# Patient Record
Sex: MALE | Race: WHITE | NOT HISPANIC OR LATINO | Employment: OTHER | URBAN - METROPOLITAN AREA
[De-identification: names, ages, dates, MRNs, and addresses within clinical notes are randomized per-mention and may not be internally consistent; named-entity substitution may affect disease eponyms.]

---

## 2021-02-23 ENCOUNTER — TELEPHONE (OUTPATIENT)
Dept: INTERNAL MEDICINE | Facility: CLINIC | Age: 61
End: 2021-02-23

## 2021-02-23 NOTE — TELEPHONE ENCOUNTER
PRACTICE MANAGER        Caller: Brandyn Sims    Relationship to patient: Self    Best call back number:[phone]     Chief complaint: PHYSICAL     Type of visit: NEW PATIENT, PHYSICAL    Requested date:NEXT AVAILABLE       Additional notes:    MR. SIMS WAS LAST SEEN IN 2016 BY DR. CARLSON, HE IS WANTING TO RE ESTABLISH WITH DR. CARLSON. I INFORMED HIM THAT HE DID NOT HAVE ANY NEW PATIENT APPOINTMENTS AVAILABLE, OFFERED A DIFFERENT PROVIDER. HE WOULD RATHER SEE IF DR. CARLSON WILL CONTINUE SEEING HIM.    PLEASE ADVISE

## 2021-03-23 ENCOUNTER — OFFICE VISIT (OUTPATIENT)
Dept: INTERNAL MEDICINE | Facility: CLINIC | Age: 61
End: 2021-03-23

## 2021-03-23 VITALS
OXYGEN SATURATION: 98 % | HEIGHT: 74 IN | HEART RATE: 70 BPM | WEIGHT: 211.2 LBS | SYSTOLIC BLOOD PRESSURE: 104 MMHG | DIASTOLIC BLOOD PRESSURE: 68 MMHG | BODY MASS INDEX: 27.11 KG/M2

## 2021-03-23 DIAGNOSIS — Z00.00 ROUTINE PHYSICAL EXAMINATION: ICD-10-CM

## 2021-03-23 DIAGNOSIS — E78.2 MIXED HYPERLIPIDEMIA: Chronic | ICD-10-CM

## 2021-03-23 DIAGNOSIS — E55.9 VITAMIN D DEFICIENCY: Chronic | ICD-10-CM

## 2021-03-23 DIAGNOSIS — Z11.59 NEED FOR HEPATITIS C SCREENING TEST: ICD-10-CM

## 2021-03-23 DIAGNOSIS — Z12.11 COLON CANCER SCREENING: ICD-10-CM

## 2021-03-23 DIAGNOSIS — R73.01 IMPAIRED FASTING GLUCOSE: Primary | ICD-10-CM

## 2021-03-23 DIAGNOSIS — R39.15 URINARY URGENCY: ICD-10-CM

## 2021-03-23 DIAGNOSIS — Z51.81 THERAPEUTIC DRUG MONITORING: ICD-10-CM

## 2021-03-23 DIAGNOSIS — N52.9 MALE ERECTILE DISORDER: Chronic | ICD-10-CM

## 2021-03-23 PROCEDURE — 99204 OFFICE O/P NEW MOD 45 MIN: CPT | Performed by: INTERNAL MEDICINE

## 2021-03-23 NOTE — PROGRESS NOTES
03/23/2021    Patient Information  Brandyn Sims                                                                                          [address] [facility] [address]      1960  [unfilled]  There is no work phone number on file.    Chief Complaint:     Follow-up medical problems listed in history of present illness.  Patient has not been seen in 5 years or so and is here to get reestablished.  Complaining of urinary problems.    History of Present Illness:    Patient with a history of impaired fasting glucose, hyperlipidemia, vitamin D deficiency, male erectile dysfunction.  He presents after a prolonged absence of several years.  He was wanting to have his physical but unfortunately blood work was not done.  He is behind on multiple healthcare parameters which we will hopefully take care of over the next several months.  His past medical history reviewed and updated were necessary in the electronic medical record.    The history regarding urinary urgency:    March 23, 2021--patient presents with approximately 1 year history of urinary urgency that is not associated with weak stream or other BPH symptoms although he can get up a couple of times per night.  No burning with urination and no blood noted in the urine.  It has been probably about 5 years since patient has had a PSA.  We are going to schedule him for lab work and annual physical here in the near future and we can readdress this issue at that time.    Review of Systems   Constitutional: Negative.   HENT: Negative.    Eyes: Negative.    Cardiovascular: Negative.    Respiratory: Negative.    Endocrine: Negative.    Hematologic/Lymphatic: Negative.    Skin: Negative.    Musculoskeletal: Negative.    Gastrointestinal: Negative.    Genitourinary: Negative.    Neurological: Negative.    Psychiatric/Behavioral: Negative.    Allergic/Immunologic: Negative.        Active Problems:    Patient Active Problem List   Diagnosis   •  "Hyperlipidemia   • Impaired fasting glucose   • Vitamin D deficiency   • Routine physical examination   • Therapeutic drug monitoring   • Male erectile disorder   • Urinary urgency         Past Medical History:   Diagnosis Date   • Hyperlipidemia 3/1/2016   • Impaired fasting glucose 3/1/2016   • Male erectile disorder 3/4/2016    2016--patient seen for a routine annual and apparently is having some problems with erectile dysfunction.  He would like to have a prescription for Cialis.  Cialis 5 mg #30.   • Vitamin D deficiency 3/1/2016         Past Surgical History:   Procedure Laterality Date   • NO PAST SURGERIES           No Known Allergies      No current outpatient medications on file.      Family History   Problem Relation Age of Onset   • Cancer Mother         Breast Cancer   • Diabetes Father         Type 2   • Heart attack Maternal Grandfather          of an Acute Myocardial Infarction.         Social History     Socioeconomic History   • Marital status:      Spouse name: Not on file   • Number of children: Not on file   • Years of education: Not on file   • Highest education level: Not on file   Tobacco Use   • Smoking status: Never Smoker   • Smokeless tobacco: Never Used   Vaping Use   • Vaping Use: Never used   Substance and Sexual Activity   • Alcohol use: Yes     Comment: Very Moderately   • Drug use: No   • Sexual activity: Yes     Partners: Female         Vitals:    21 1429   BP: 104/68   BP Location: Left arm   Patient Position: Sitting   Cuff Size: Adult   Pulse: 70   SpO2: 98%   Weight: 95.8 kg (211 lb 3.2 oz)   Height: 188 cm (74\")        Body mass index is 27.12 kg/m².      Physical Exam:    General: Alert and oriented x 3.  No acute distress.  Normal affect.  HEENT: Pupils equal, round, reactive to light; extraocular movements intact; sclerae nonicteric; pharynx, ear canals and TMs normal.  Neck: Without JVD, thyromegaly, bruit, or adenopathy.  Lungs: Clear to " auscultation in all fields.  Heart: Regular rate and rhythm without murmur, rub, gallop, or click.  Abdomen: Soft, nontender, without hepatosplenomegaly or hernia.  Bowel sounds normal.  : Deferred.  Rectal: Deferred.  Extremities: Without clubbing, cyanosis, edema, or pulse deficit.  Neurologic: Intact without focal deficit.  Normal station and gait observed during ingress and egress from the examination room.  Skin: Without significant lesion.  Musculoskeletal: Unremarkable.    Lab/other results:      Assessment/Plan:     Diagnosis Plan   1. Impaired fasting glucose  Hemoglobin A1c   2. Hyperlipidemia  NMR LipoProfile   3. Vitamin D deficiency  Vitamin D 25 Hydroxy   4. Male erectile disorder     5. Urinary urgency     6. Therapeutic drug monitoring  CBC (No Diff)    Comprehensive Metabolic Panel    Hemoglobin A1c    NMR LipoProfile    Vitamin D 25 Hydroxy    Urinalysis With Microscopic If Indicated (No Culture) - Urine, Clean Catch    TSH    T4, Free    T3, Free    PSA DIAGNOSTIC    Hepatitis C Antibody   7. Need for hepatitis C screening test  Hepatitis C Antibody   8. Routine physical examination  CBC (No Diff)    Comprehensive Metabolic Panel    Hemoglobin A1c    NMR LipoProfile    Vitamin D 25 Hydroxy    Urinalysis With Microscopic If Indicated (No Culture) - Urine, Clean Catch    TSH    T4, Free    T3, Free    PSA DIAGNOSTIC    Hepatitis C Antibody     New patient to get reestablished who is behind on several health care maintenance issues that we will correct here over the next several months or so.  Recently retired and has more time to take care of himself.  His impaired fasting glucose is a little concerning given the fact that he is having urinary frequency and urgency although not significant BPH symptoms.  He is a little more thirsty than usual we need to make sure he has not developed overt diabetes.  His hyperlipidemia has been fairly significant but not treated and this needs to be reassessed.   Patient is currently in the process of receiving his COVID-19 vaccines and therefore I will not administer any other vaccines that he needs at this time.  We can always do that later.  He does need a screening colonoscopy which I can go ahead and place that order now.    Plan is as follows: Colonoscopy scheduled.  Set up fasting lab and annual physical in the near future.      Procedures

## 2021-03-24 ENCOUNTER — LAB (OUTPATIENT)
Dept: LAB | Facility: HOSPITAL | Age: 61
End: 2021-03-24

## 2021-03-24 DIAGNOSIS — Z00.00 ROUTINE PHYSICAL EXAMINATION: ICD-10-CM

## 2021-03-24 DIAGNOSIS — Z51.81 THERAPEUTIC DRUG MONITORING: ICD-10-CM

## 2021-03-24 DIAGNOSIS — R73.01 IMPAIRED FASTING GLUCOSE: ICD-10-CM

## 2021-03-24 DIAGNOSIS — E78.2 MIXED HYPERLIPIDEMIA: Chronic | ICD-10-CM

## 2021-03-24 DIAGNOSIS — E55.9 VITAMIN D DEFICIENCY: Chronic | ICD-10-CM

## 2021-03-24 DIAGNOSIS — Z11.59 NEED FOR HEPATITIS C SCREENING TEST: ICD-10-CM

## 2021-03-24 LAB
25(OH)D3 SERPL-MCNC: 26.1 NG/ML (ref 30–100)
ALBUMIN SERPL-MCNC: 4.6 G/DL (ref 3.5–5.2)
ALBUMIN/GLOB SERPL: 1.6 G/DL
ALP SERPL-CCNC: 92 U/L (ref 39–117)
ALT SERPL W P-5'-P-CCNC: 22 U/L (ref 1–41)
ANION GAP SERPL CALCULATED.3IONS-SCNC: 12.7 MMOL/L (ref 5–15)
AST SERPL-CCNC: 19 U/L (ref 1–40)
BILIRUB SERPL-MCNC: 1.3 MG/DL (ref 0–1.2)
BILIRUB UR QL STRIP: NEGATIVE
BUN SERPL-MCNC: 17 MG/DL (ref 8–23)
BUN/CREAT SERPL: 16 (ref 7–25)
CALCIUM SPEC-SCNC: 9.4 MG/DL (ref 8.6–10.5)
CHLORIDE SERPL-SCNC: 99 MMOL/L (ref 98–107)
CLARITY UR: CLEAR
CO2 SERPL-SCNC: 26.3 MMOL/L (ref 22–29)
COLOR UR: YELLOW
CREAT SERPL-MCNC: 1.06 MG/DL (ref 0.76–1.27)
DEPRECATED RDW RBC AUTO: 43.4 FL (ref 37–54)
ERYTHROCYTE [DISTWIDTH] IN BLOOD BY AUTOMATED COUNT: 13.1 % (ref 12.3–15.4)
GFR SERPL CREATININE-BSD FRML MDRD: 71 ML/MIN/1.73
GLOBULIN UR ELPH-MCNC: 2.8 GM/DL
GLUCOSE SERPL-MCNC: 310 MG/DL (ref 65–99)
GLUCOSE UR STRIP-MCNC: ABNORMAL MG/DL
HBA1C MFR BLD: 13.23 % (ref 4.8–5.6)
HCT VFR BLD AUTO: 48.8 % (ref 37.5–51)
HCV AB SER DONR QL: NORMAL
HGB BLD-MCNC: 16.2 G/DL (ref 13–17.7)
HGB UR QL STRIP.AUTO: NEGATIVE
KETONES UR QL STRIP: ABNORMAL
LEUKOCYTE ESTERASE UR QL STRIP.AUTO: NEGATIVE
MCH RBC QN AUTO: 30.3 PG (ref 26.6–33)
MCHC RBC AUTO-ENTMCNC: 33.2 G/DL (ref 31.5–35.7)
MCV RBC AUTO: 91.4 FL (ref 79–97)
NITRITE UR QL STRIP: NEGATIVE
PH UR STRIP.AUTO: 5.5 [PH] (ref 5–8)
PLATELET # BLD AUTO: 127 10*3/MM3 (ref 140–450)
PMV BLD AUTO: 13.4 FL (ref 6–12)
POTASSIUM SERPL-SCNC: 4.2 MMOL/L (ref 3.5–5.2)
PROT SERPL-MCNC: 7.4 G/DL (ref 6–8.5)
PROT UR QL STRIP: ABNORMAL
PSA SERPL-MCNC: 2.48 NG/ML (ref 0–4)
RBC # BLD AUTO: 5.34 10*6/MM3 (ref 4.14–5.8)
SODIUM SERPL-SCNC: 138 MMOL/L (ref 136–145)
SP GR UR STRIP: >=1.03 (ref 1–1.03)
T3FREE SERPL-MCNC: 2.71 PG/ML (ref 2–4.4)
T4 FREE SERPL-MCNC: 1.19 NG/DL (ref 0.93–1.7)
TSH SERPL DL<=0.05 MIU/L-ACNC: 2.17 UIU/ML (ref 0.27–4.2)
UROBILINOGEN UR QL STRIP: ABNORMAL
WBC # BLD AUTO: 6.13 10*3/MM3 (ref 3.4–10.8)

## 2021-03-24 PROCEDURE — 80061 LIPID PANEL: CPT

## 2021-03-24 PROCEDURE — 86803 HEPATITIS C AB TEST: CPT

## 2021-03-24 PROCEDURE — 83036 HEMOGLOBIN GLYCOSYLATED A1C: CPT

## 2021-03-24 PROCEDURE — 83704 LIPOPROTEIN BLD QUAN PART: CPT

## 2021-03-24 PROCEDURE — 36415 COLL VENOUS BLD VENIPUNCTURE: CPT

## 2021-03-24 PROCEDURE — 80053 COMPREHEN METABOLIC PANEL: CPT

## 2021-03-24 PROCEDURE — 81003 URINALYSIS AUTO W/O SCOPE: CPT

## 2021-03-24 PROCEDURE — 85027 COMPLETE CBC AUTOMATED: CPT

## 2021-03-24 PROCEDURE — 84443 ASSAY THYROID STIM HORMONE: CPT

## 2021-03-24 PROCEDURE — 82306 VITAMIN D 25 HYDROXY: CPT

## 2021-03-24 PROCEDURE — 84439 ASSAY OF FREE THYROXINE: CPT

## 2021-03-24 PROCEDURE — 84153 ASSAY OF PSA TOTAL: CPT

## 2021-03-24 PROCEDURE — 84481 FREE ASSAY (FT-3): CPT

## 2021-03-26 LAB
CHOLEST SERPL-MCNC: 302 MG/DL (ref 100–199)
HDL SERPL-SCNC: 30.2 UMOL/L
HDLC SERPL-MCNC: 42 MG/DL
LDL SERPL QN: 19.9 NM
LDL SERPL-SCNC: 2721 NMOL/L
LDL SMALL SERPL-SCNC: 1755 NMOL/L
LDLC SERPL CALC-MCNC: 192 MG/DL (ref 0–99)
TRIGL SERPL-MCNC: 342 MG/DL (ref 0–149)

## 2021-03-30 ENCOUNTER — OFFICE VISIT (OUTPATIENT)
Dept: INTERNAL MEDICINE | Facility: CLINIC | Age: 61
End: 2021-03-30

## 2021-03-30 VITALS
HEART RATE: 79 BPM | HEIGHT: 74 IN | BODY MASS INDEX: 27.08 KG/M2 | OXYGEN SATURATION: 98 % | WEIGHT: 211 LBS | DIASTOLIC BLOOD PRESSURE: 70 MMHG | SYSTOLIC BLOOD PRESSURE: 110 MMHG

## 2021-03-30 DIAGNOSIS — R39.15 URINARY URGENCY: ICD-10-CM

## 2021-03-30 DIAGNOSIS — N52.9 MALE ERECTILE DISORDER: Chronic | ICD-10-CM

## 2021-03-30 DIAGNOSIS — Z23 NEED FOR TDAP VACCINATION: ICD-10-CM

## 2021-03-30 DIAGNOSIS — E78.2 MIXED HYPERLIPIDEMIA: Chronic | ICD-10-CM

## 2021-03-30 DIAGNOSIS — Z00.00 ROUTINE PHYSICAL EXAMINATION: Primary | ICD-10-CM

## 2021-03-30 DIAGNOSIS — D69.6 THROMBOCYTOPENIA (HCC): ICD-10-CM

## 2021-03-30 DIAGNOSIS — E11.9 ENCOUNTER FOR DIABETIC FOOT EXAM (HCC): ICD-10-CM

## 2021-03-30 DIAGNOSIS — R73.01 IMPAIRED FASTING GLUCOSE: Chronic | ICD-10-CM

## 2021-03-30 DIAGNOSIS — E11.9 TYPE 2 DIABETES MELLITUS WITHOUT COMPLICATION, WITHOUT LONG-TERM CURRENT USE OF INSULIN (HCC): ICD-10-CM

## 2021-03-30 DIAGNOSIS — Z51.81 THERAPEUTIC DRUG MONITORING: ICD-10-CM

## 2021-03-30 DIAGNOSIS — E55.9 VITAMIN D DEFICIENCY: Chronic | ICD-10-CM

## 2021-03-30 PROCEDURE — 99396 PREV VISIT EST AGE 40-64: CPT | Performed by: INTERNAL MEDICINE

## 2021-03-30 RX ORDER — SITAGLIPTIN AND METFORMIN HYDROCHLORIDE 1000; 50 MG/1; MG/1
TABLET, FILM COATED, EXTENDED RELEASE ORAL
Qty: 180 TABLET | Refills: 1
Start: 2021-03-30 | End: 2021-04-20 | Stop reason: SDUPTHER

## 2021-03-30 NOTE — PROGRESS NOTES
03/30/2021    Patient Information  Brandyn Sims                                                                                          [address] [address]      1960  [unfilled]  There is no work phone number on file.    Chief Complaint:     Routine physical examination and follow-up lab work.  No new acute complaints.    History of Present Illness:    Patient with a history of hyperlipidemia, impaired fasting glucose, vitamin D deficiency, male erectile disorder, urinary urgency.  He presents today for his routine annual physical exam and follow-up lab work.  His past medical history reviewed and updated were necessary including health maintenance parameters.  This reveals he needs Shingrix vaccine as well as Tdap.  However, he is in the midst of obtaining his Covid vaccination and we will defer this for now.    Review of Systems   Constitutional: Negative.   HENT: Negative.    Eyes: Negative.    Cardiovascular: Negative.    Respiratory: Negative.    Endocrine: Negative.    Hematologic/Lymphatic: Negative.    Skin: Negative.    Musculoskeletal: Negative.    Gastrointestinal: Negative.    Genitourinary: Positive for urgency.   Neurological: Negative.    Psychiatric/Behavioral: Negative.    Allergic/Immunologic: Negative.        Active Problems:    Patient Active Problem List   Diagnosis   • Hyperlipidemia   • Vitamin D deficiency   • Routine physical examination   • Therapeutic drug monitoring   • Male erectile disorder   • Urinary urgency   • Type 2 diabetes mellitus without complication, without long-term current use of insulin (CMS/[facility])   • Thrombocytopenia (CMS/[facility])   • Diabetic foot exam         Past Medical History:   Diagnosis Date   • Hyperlipidemia 3/1/2016   • Male erectile disorder 3/4/2016    03/04/2016--patient seen for a routine annual and apparently is having some problems with erectile dysfunction.  He would like to have a prescription for Cialis.  " Cialis 5 mg #30.   • Thrombocytopenia (CMS/[facility]) 3/30/2021    2021--routine physical.  Platelets low 227.  Will monitor.   • Type 2 diabetes mellitus without complication, without long-term current use of insulin (CMS/[facility]) 3/30/2021    2021--initial diagnosis and treatment.  Hemoglobin A1c 13.23.   • Vitamin D deficiency 3/1/2016         Past Surgical History:   Procedure Laterality Date   • NO PAST SURGERIES           No Known Allergies        Current Outpatient Medications:   •  Dapagliflozin Propanediol 10 MG tablet, Take 1 p.o. daily before the first meal for diabetes, Disp: 90 tablet, Rfl: 1  •  SITagliptin-metFORMIN HCl ER (Janumet XR)  MG tablet, Take 1 p.o. twice daily for diabetes, Disp: 180 tablet, Rfl: 1  •  vitamin D3 125 MCG (5000 UT) capsule capsule, 1 by mouth daily as directed, Disp: 30 capsule, Rfl:       Family History   Problem Relation Age of Onset   • Cancer Mother         Breast Cancer   • Diabetes Father         Type 2   • Heart attack Maternal Grandfather          of an Acute Myocardial Infarction.         Social History     Socioeconomic History   • Marital status:      Spouse name: Not on file   • Number of children: Not on file   • Years of education: Not on file   • Highest education level: Not on file   Tobacco Use   • Smoking status: Never Smoker   • Smokeless tobacco: Never Used   Vaping Use   • Vaping Use: Never used   Substance and Sexual Activity   • Alcohol use: Yes     Comment: Very Moderately   • Drug use: No   • Sexual activity: Yes     Partners: Female         Vitals:    21 0930   BP: 110/70   BP Location: Left arm   Pulse: 79   SpO2: 98%   Weight: 95.7 kg (211 lb)   Height: 188 cm (74.02\")        Body mass index is 27.08 kg/m².      Physical Exam:    General: Alert and oriented x 3.  No acute distress.  Normal affect.  HEENT: Pupils equal, round, reactive to light; extraocular movements intact; sclerae nonicteric; pharynx, ear canals " and TMs normal.  Neck: Without JVD, thyromegaly, bruit, or adenopathy.  Lungs: Clear to auscultation in all fields.  Heart: Regular rate and rhythm without murmur, rub, gallop, or click.  Abdomen: Soft, nontender, without hepatosplenomegaly or hernia.  Bowel sounds normal.  : Deferred.  Rectal: Deferred.  Extremities: Without clubbing, cyanosis, edema, or pulse deficit.  Neurologic: Intact without focal deficit.  Normal station and gait observed during ingress and egress from the examination room.  Skin: Without significant lesion.  Musculoskeletal: Unremarkable.    Lab/other results:    NMR reveals a total cholesterol of 302.  Triglycerides elevated at 342.  LDL particle number markedly elevated to 2721.  Small LDL particle number markedly elevated 1755.  HDL particle number low at 30.2.  CMP normal except glucose elevated at 310.  Hemoglobin A1c 13.23.  Vitamin D low at 26.1.  Urinalysis reveals 3+ glucose and trace ketones.  CBC normal except platelets low 127.  Thyroid function tests are normal.  PSA normal at 2.48.  Hepatitis C antibody screening is negative.    March 30, 2021--routine diabetic foot exam reveals no evidence of diabetic foot ulcer.  No preulcerative callus.  Distal pulses palpable.  Sensation intact.    Assessment/Plan:     Diagnosis Plan   1. Routine physical examination     2. Hyperlipidemia     3. Impaired fasting glucose     4. Vitamin D deficiency  vitamin D3 125 MCG (5000 UT) capsule capsule   5. Urinary urgency     6. Need for Tdap vaccination     7. Male erectile disorder     8. Therapeutic drug monitoring     9. Type 2 diabetes mellitus without complication, without long-term current use of insulin (CMS/HCC)  SITagliptin-metFORMIN HCl ER (Janumet XR)  MG tablet    Dapagliflozin Propanediol 10 MG tablet   10. Thrombocytopenia (CMS/HCC)     11. Diabetic foot exam       Patient presents with essentially normal annual except for several concerning issues: He has significant  hyperlipidemia that definitely needs to be treated.  However, his impaired fasting glucose is mild overt diabetes and this needs to be addressed first.  Vitamin D is low and he needs vitamin D supplementation.  Male erectile dysfunction is likely related to the poorly controlled diabetes.  Patient is aware that he needs to follow a low carbohydrate diet, exercise, and lose weight.  I explained to him he is at risk for complications from diabetes including cardiovascular disease and stroke.    Several preventative health issues discussed including review of vaccinations and recommendations, including dietary issues, exercise and weight loss.  Safe sex practices discussed.  Patient advised to wear seatbelt whenever driving and avoid texting and driving.  Also advised to look both ways before crossing the street.  Colon cancer prevention discussed and is up-to-date with colonoscopy.  Advised to avoid tobacco products and minimize alcohol consumption.    Plan is as follows: Start Janumet 50/1000, 1 p.o. twice daily.  Start Farxiga 10 mg/day before the first meal.  Vitamin D 5000 units/day.  Patient will need to follow-up in about 3 weeks to reassess the situation including his tolerance of the medication.  We will likely check a CMP at that time but patient does not need to be fasting.  I will schedule future follow-up at that time.  We will not address cholesterol until we get the diabetes under reasonable control.    Procedures

## 2021-04-20 ENCOUNTER — LAB (OUTPATIENT)
Dept: LAB | Facility: HOSPITAL | Age: 61
End: 2021-04-20

## 2021-04-20 ENCOUNTER — OFFICE VISIT (OUTPATIENT)
Dept: INTERNAL MEDICINE | Facility: CLINIC | Age: 61
End: 2021-04-20

## 2021-04-20 VITALS
RESPIRATION RATE: 16 BRPM | OXYGEN SATURATION: 97 % | BODY MASS INDEX: 27.18 KG/M2 | WEIGHT: 211.8 LBS | HEART RATE: 112 BPM | TEMPERATURE: 97.4 F | DIASTOLIC BLOOD PRESSURE: 64 MMHG | SYSTOLIC BLOOD PRESSURE: 108 MMHG | HEIGHT: 74 IN

## 2021-04-20 DIAGNOSIS — E11.9 TYPE 2 DIABETES MELLITUS WITHOUT COMPLICATION, WITHOUT LONG-TERM CURRENT USE OF INSULIN (HCC): Primary | Chronic | ICD-10-CM

## 2021-04-20 DIAGNOSIS — Z51.81 THERAPEUTIC DRUG MONITORING: ICD-10-CM

## 2021-04-20 DIAGNOSIS — E11.9 TYPE 2 DIABETES MELLITUS WITHOUT COMPLICATION, WITHOUT LONG-TERM CURRENT USE OF INSULIN (HCC): Chronic | ICD-10-CM

## 2021-04-20 DIAGNOSIS — D69.6 THROMBOCYTOPENIA (HCC): Chronic | ICD-10-CM

## 2021-04-20 DIAGNOSIS — E55.9 VITAMIN D DEFICIENCY: Chronic | ICD-10-CM

## 2021-04-20 DIAGNOSIS — E78.2 MIXED HYPERLIPIDEMIA: Chronic | ICD-10-CM

## 2021-04-20 DIAGNOSIS — R39.15 URINARY URGENCY: ICD-10-CM

## 2021-04-20 LAB
ALBUMIN SERPL-MCNC: 4.5 G/DL (ref 3.5–5.2)
ALBUMIN/GLOB SERPL: 1.9 G/DL
ALP SERPL-CCNC: 64 U/L (ref 39–117)
ALT SERPL W P-5'-P-CCNC: 16 U/L (ref 1–41)
ANION GAP SERPL CALCULATED.3IONS-SCNC: 13.2 MMOL/L (ref 5–15)
AST SERPL-CCNC: 16 U/L (ref 1–40)
BILIRUB SERPL-MCNC: 0.8 MG/DL (ref 0–1.2)
BUN SERPL-MCNC: 21 MG/DL (ref 8–23)
BUN/CREAT SERPL: 18.4 (ref 7–25)
CALCIUM SPEC-SCNC: 9.3 MG/DL (ref 8.6–10.5)
CHLORIDE SERPL-SCNC: 103 MMOL/L (ref 98–107)
CO2 SERPL-SCNC: 22.8 MMOL/L (ref 22–29)
CREAT SERPL-MCNC: 1.14 MG/DL (ref 0.76–1.27)
GFR SERPL CREATININE-BSD FRML MDRD: 65 ML/MIN/1.73
GLOBULIN UR ELPH-MCNC: 2.4 GM/DL
GLUCOSE SERPL-MCNC: 136 MG/DL (ref 65–99)
POTASSIUM SERPL-SCNC: 4.3 MMOL/L (ref 3.5–5.2)
PROT SERPL-MCNC: 6.9 G/DL (ref 6–8.5)
SODIUM SERPL-SCNC: 139 MMOL/L (ref 136–145)

## 2021-04-20 PROCEDURE — 36415 COLL VENOUS BLD VENIPUNCTURE: CPT | Performed by: INTERNAL MEDICINE

## 2021-04-20 PROCEDURE — 80053 COMPREHEN METABOLIC PANEL: CPT | Performed by: INTERNAL MEDICINE

## 2021-04-20 PROCEDURE — 99214 OFFICE O/P EST MOD 30 MIN: CPT | Performed by: INTERNAL MEDICINE

## 2021-04-20 RX ORDER — SITAGLIPTIN AND METFORMIN HYDROCHLORIDE 1000; 50 MG/1; MG/1
TABLET, FILM COATED, EXTENDED RELEASE ORAL
Qty: 180 TABLET | Refills: 1 | Status: SHIPPED | OUTPATIENT
Start: 2021-04-20

## 2021-04-20 NOTE — PROGRESS NOTES
04/20/2021    Patient Information  Brandyn Sims                                                                                          [id number] [id number]      1960  [unfilled]  There is no work phone number on file.    Chief Complaint:     Follow-up blood work after initiation of new medication for poorly controlled and new diagnosis of type 2 diabetes.    History of Present Illness:    Patient with a history of newly diagnosed type 2 diabetes, hyperlipidemia, vitamin D deficiency, thrombocytopenia.  He presents today for follow-up after we initiated medication after we discovered his hemoglobin A1c was approximately 13.  We started patient on Farxiga 10 mg/day before the first meal.  We also started Janumet 50/1000, 2 p.o. daily.  Patient seemed to tolerate this well and has noticed no significant change in his frequent urination/urinary urgency.  Past medical history reviewed and updated were necessary including health maintenance parameters.  This reveals he has several health maintenance issues that need to be addressed in the near future.    Review of Systems   Constitutional: Negative.   HENT: Negative.    Eyes: Negative.    Cardiovascular: Negative.    Respiratory: Negative.    Endocrine: Negative.    Hematologic/Lymphatic: Negative.    Skin: Negative.    Musculoskeletal: Negative.    Gastrointestinal: Negative.    Genitourinary: Positive for urgency.   Neurological: Negative.    Psychiatric/Behavioral: Negative.    Allergic/Immunologic: Negative.        Active Problems:    Patient Active Problem List   Diagnosis   • Hyperlipidemia   • Vitamin D deficiency   • Routine physical examination   • Therapeutic drug monitoring   • Male erectile disorder   • Urinary urgency   • Type 2 diabetes mellitus without complication, without long-term current use of insulin (CMS/[facility])   • Thrombocytopenia (CMS/[facility])   • Diabetic foot exam         Past Medical History:    Diagnosis Date   • Hyperlipidemia 3/1/2016   • Male erectile disorder 3/4/2016    2016--patient seen for a routine annual and apparently is having some problems with erectile dysfunction.  He would like to have a prescription for Cialis.  Cialis 5 mg #30.   • Thrombocytopenia (CMS/[facility]) 3/30/2021    2021--routine physical.  Platelets low 227.  Will monitor.   • Type 2 diabetes mellitus without complication, without long-term current use of insulin (CMS/[facility]) 3/30/2021    2021--initial diagnosis and treatment.  Hemoglobin A1c 13.23.   • Vitamin D deficiency 3/1/2016         Past Surgical History:   Procedure Laterality Date   • NO PAST SURGERIES           No Known Allergies        Current Outpatient Medications:   •  Dapagliflozin Propanediol 10 MG tablet, Take 1 p.o. daily before the first meal for diabetes, Disp: 90 tablet, Rfl: 1  •  SITagliptin-metFORMIN HCl ER (Janumet XR)  MG tablet, Take 1 p.o. twice daily for diabetes, Disp: 180 tablet, Rfl: 1  •  vitamin D3 125 MCG (5000 UT) capsule capsule, 1 by mouth daily as directed, Disp: 30 capsule, Rfl:       Family History   Problem Relation Age of Onset   • Cancer Mother         Breast Cancer   • Diabetes Father         Type 2   • Heart attack Maternal Grandfather          of an Acute Myocardial Infarction.         Social History     Socioeconomic History   • Marital status:      Spouse name: Not on file   • Number of children: Not on file   • Years of education: Not on file   • Highest education level: Not on file   Tobacco Use   • Smoking status: Never Smoker   • Smokeless tobacco: Never Used   Vaping Use   • Vaping Use: Never used   Substance and Sexual Activity   • Alcohol use: Yes     Comment: Very Moderately   • Drug use: No   • Sexual activity: Yes     Partners: Female         Vitals:    21 1306   BP: 108/64   Pulse: 112   Resp: 16   Temp: 97.4 °F (36.3 °C)   SpO2: 97%   Weight: 96.1 kg (211 lb 12.8 oz)  "  Height: 188 cm (74\")        Body mass index is 27.19 kg/m².      Physical Exam:    General: Alert and oriented x 3.  No acute distress.  Normal affect.  Mildly overweight.  HEENT: Pupils equal, round, reactive to light; extraocular movements intact; sclerae nonicteric; pharynx, ear canals and TMs normal.  Neck: Without JVD, thyromegaly, bruit, or adenopathy.  Lungs: Clear to auscultation in all fields.  Heart: Regular rate and rhythm without murmur, rub, gallop, or click.  Abdomen: Soft, nontender, without hepatosplenomegaly or hernia.  Bowel sounds normal.  : Deferred.  Rectal: Deferred.  Extremities: Without clubbing, cyanosis, edema, or pulse deficit.  Neurologic: Intact without focal deficit.  Normal station and gait observed during ingress and egress from the examination room.  Skin: Without significant lesion.  Musculoskeletal: Unremarkable.    Lab/other results:  Contains abnormal data Hemoglobin A1c  Order: [id number]  Status:  Final result   Visible to patient:  No (not released)   Next appt:  None   Dx:  Impaired fasting glucose; Routine phy...  Specimen Information: Blood        Component   Ref Range & Units 3 wk ago   Hemoglobin A1C   4.80 - 5.60 % 13.23High     Resulting Agency [facility]      Narrative  Performed by: [facility]  Hemoglobin A1C Ranges:     Increased Risk for Diabetes  5.7% to 6.4%   Diabetes                     >= 6.5%   Diabetic Goal                < 7.0%      Specimen Collected: 03/24/21 09:58 Last Resulted: 03/24/21 23:49         Contains abnormal data Comprehensive Metabolic Panel  Order: [id number]  Status:  Final result   Visible to patient:  No (not released)   Next appt:  None   Dx:  Routine physical examination; Therape...  Specimen Information: Blood        Component   Ref Range & Units 3 wk ago   Glucose   65 - 99 mg/dL 310High     BUN   8 - 23 mg/dL 17    Creatinine   0.76 - 1.27 mg/dL 1.06    Sodium   136 - 145 mmol/L 138    Potassium   3.5 - 5.2 mmol/L 4.2    Chloride "   98 - 107 mmol/L 99    CO2   22.0 - 29.0 mmol/L 26.3    Calcium   8.6 - 10.5 mg/dL 9.4    Total Protein   6.0 - 8.5 g/dL 7.4    Albumin   3.50 - 5.20 g/dL 4.60    ALT (SGPT)   1 - 41 U/L 22    AST (SGOT)   1 - 40 U/L 19    Alkaline Phosphatase   39 - 117 U/L 92    Total Bilirubin   0.0 - 1.2 mg/dL 1.3High     eGFR Non African Amer   >60 mL/min/1.73 71    Globulin   gm/dL 2.8    A/G Ratio   g/dL 1.6    BUN/Creatinine Ratio   7.0 - 25.0 16.0    Anion Gap   5.0 - 15.0 mmol/L 12.7    Resulting Agency [facility]      Narrative  Performed by: [facility]  GFR Normal >60   Chronic Kidney Disease <60   Kidney Failure <15       Specimen Collected: 03/24/21 09:58 Last Resulted: 03/24/21 22:07           Contains abnormal data NMR LipoProfile  Order: [id number]  Status:  Final result   Visible to patient:  No (not released)   Next appt:  None   Dx:  Hyperlipidemia; Routine physical exam...  Specimen Information: Blood        Component   Ref Range & Units 3 wk ago   LDL-P   <1000 nmol/L 2721High     Comment:                           Low                   < 1000                             Moderate         1000 - 1299                             Borderline-High  1300 - 1599                             High             1600 - 2000                             Very High             > 2000   LDL-C (NIH Calc)   0 - 99 mg/dL 192High     Comment:                           Optimal               <  100                             Above optimal     100 -  129                             Borderline        130 -  159                             High              160 -  189                             Very high             >  189   HDL-C   >39 mg/dL 42    Triglycerides   0 - 149 mg/dL 342High     Total Cholesterol   100 - 199 mg/dL 302High     HDL-P (Total)   >=30.5 umol/L 30.2Low     Small LDL-P   <=527 nmol/L 1755High     LDL Size   >20.5 nm 19.9Low     Comment:  ----------------------------------------------------------                     ** INTERPRETATIVE INFORMATION**                    PARTICLE CONCENTRATION AND SIZE                       <--Lower CVD Risk   Higher CVD Risk-->     LDL AND HDL PARTICLES   Percentile in Reference Population     HDL-P (total)        High     75th    50th    25th   Low                          >34.9    34.9    30.5    26.7   <26.7     Small LDL-P          Low      25th    50th    75th   High                          <117     117     527     839    >839     LDL Size   <-Large (Pattern A)->    <-Small (Pattern B)->                      23.0    20.6           20.5      19.0    ----------------------------------------------------------   Small LDL-P and LDL Size are associated with CVD risk, but not after   LDL-P is taken into account.   Resulting Agency [facility]      Narrative  Performed by: [facility]              Assessment/Plan:     Diagnosis Plan   1. Type 2 diabetes mellitus without complication, without long-term current use of insulin (CMS/[facility])  Comprehensive Metabolic Panel    Hemoglobin A1c    Microalbumin / Creatinine Urine Ratio - Urine, Clean Catch    Comprehensive Metabolic Panel    SITagliptin-metFORMIN HCl ER (Janumet XR)  MG tablet    Dapagliflozin Propanediol 10 MG tablet   2. Hyperlipidemia  NMR LipoProfile   3. Thrombocytopenia (CMS/[facility])  CBC (No Diff)   4. Vitamin D deficiency     5. Urinary urgency     6. Therapeutic drug monitoring       Patient with a new diagnosis of poorly controlled type 2 diabetes.  He seems to be tolerating the Janumet and Farxiga well.  We need to assess his renal function and electrolyte status today.  He has significant hyperlipidemia but I want to wait until we get his diabetes under better control before I reassess this and likely start medication.  Thrombocytopenia needs to be reassessed in the near future.  Urinary urgency has definitely improved with treatment of the diabetes.    Plan is as follows: No change in current medical regimen.  Check CMP today.  Patient  will follow-up on the phone for the results and possible further instructions.  I will have him obtain fasting lab work in 6 weeks and then follow-up 1 week later to reassess.  Strongly recommend that patient obtain Covid vaccine.  We will hold up on his other immunizations until after he has obtained the Covid vaccine.    Addendum: Patient will be moving to [place] but also will be back here in town in the subsequent future.  He will basically be back and forth but plans on eventually being in [place] exclusively.  [place] soon [place] border [place], [place], [place] then he can come in and obtain the fasting lab work and follow-up a week later.    Procedures

## 2021-11-27 ENCOUNTER — IMMUNIZATION (OUTPATIENT)
Dept: VACCINE CLINIC | Facility: HOSPITAL | Age: 61
End: 2021-11-27

## 2021-11-27 PROCEDURE — 0004A ADM SARSCOV2 30MCG/0.3ML BOOSTER: CPT | Performed by: INTERNAL MEDICINE

## 2021-11-27 PROCEDURE — 91300 HC SARSCOV02 VAC 30MCG/0.3ML IM: CPT | Performed by: INTERNAL MEDICINE
